# Patient Record
Sex: FEMALE
[De-identification: names, ages, dates, MRNs, and addresses within clinical notes are randomized per-mention and may not be internally consistent; named-entity substitution may affect disease eponyms.]

---

## 2024-03-04 ENCOUNTER — RX ONLY (OUTPATIENT)
Age: 59
Setting detail: RX ONLY
End: 2024-03-04

## 2024-04-09 ENCOUNTER — APPOINTMENT (RX ONLY)
Dept: URBAN - METROPOLITAN AREA CLINIC 159 | Facility: CLINIC | Age: 59
Setting detail: DERMATOLOGY
End: 2024-04-09

## 2024-04-09 DIAGNOSIS — Z41.9 ENCOUNTER FOR PROCEDURE FOR PURPOSES OTHER THAN REMEDYING HEALTH STATE, UNSPECIFIED: ICD-10-CM

## 2024-04-09 PROCEDURE — ? RECOMMENDATIONS

## 2024-04-09 PROCEDURE — ? COSMETIC CONSULTATION: AGING FACE

## 2024-04-09 NOTE — PROCEDURE: RECOMMENDATIONS
Render Risk Assessment In Note?: no
Detail Level: Zone
Recommendations (Free Text): Discussed doing a lip flip with botox, 5u/ 2:1 \\nGreat candidate for lip filler  restylane refyne , discussed expectations and SE.  $750/treatment
Recommendation Preamble: The following recommendations were made during the visit:

## 2024-06-04 ENCOUNTER — APPOINTMENT (RX ONLY)
Dept: URBAN - METROPOLITAN AREA CLINIC 159 | Facility: CLINIC | Age: 59
Setting detail: DERMATOLOGY
End: 2024-06-04

## 2024-06-04 DIAGNOSIS — Z41.9 ENCOUNTER FOR PROCEDURE FOR PURPOSES OTHER THAN REMEDYING HEALTH STATE, UNSPECIFIED: ICD-10-CM

## 2024-06-04 PROCEDURE — ? BOTOX

## 2024-06-04 PROCEDURE — ? ADDITIONAL NOTES

## 2024-06-04 ASSESSMENT — LOCATION DETAILED DESCRIPTION DERM
LOCATION DETAILED: LEFT INFERIOR TEMPLE
LOCATION DETAILED: RIGHT INFERIOR TEMPLE
LOCATION DETAILED: GLABELLA

## 2024-06-04 ASSESSMENT — LOCATION ZONE DERM: LOCATION ZONE: FACE

## 2024-06-04 ASSESSMENT — LOCATION SIMPLE DESCRIPTION DERM
LOCATION SIMPLE: GLABELLA
LOCATION SIMPLE: RIGHT TEMPLE
LOCATION SIMPLE: LEFT TEMPLE

## 2024-06-04 NOTE — PROCEDURE: ADDITIONAL NOTES
Detail Level: Simple
Render Risk Assessment In Note?: no
Additional Notes: Rec 3ml's Voluma for mid cheeks, and 1-2 vials for temples and submandibular (rec 2-3 sessions)

## 2024-06-04 NOTE — PROCEDURE: BOTOX
Show Mentalis Units: Yes
[facility] Units: 0
Additional Area 1 Units: 5
Show Lcl Units: No
Incrementing Botox Units: By 0.5 Units
Detail Level: Zone
Show Inventory Tab: Show
Additional Area 2 Location: tech neck lines
Consent: Written consent obtained. Risks include but not limited to lid/brow ptosis, bruising, swelling, diplopia, temporary effect, incomplete chemical denervation.
Post-Care Instructions: Patient instructed to not wear tight fitting hats or headbands, not to massage or manipulate injected areas, or lie down for at least 4 hours after procedure.  Limiting physical activity for 24 hours is also recommended.
Additional Area 1 Location: upper lip
Periorbital Skin Units: 20
Dilution (U/0.1 Cc): 10
Incrementing Botox Units: By 0.1 Units